# Patient Record
Sex: FEMALE | URBAN - METROPOLITAN AREA
[De-identification: names, ages, dates, MRNs, and addresses within clinical notes are randomized per-mention and may not be internally consistent; named-entity substitution may affect disease eponyms.]

---

## 2017-08-08 ENCOUNTER — IMPORTED ENCOUNTER (OUTPATIENT)
Dept: URBAN - METROPOLITAN AREA CLINIC 1 | Facility: CLINIC | Age: 77
End: 2017-08-08

## 2017-08-08 PROBLEM — Z79.4: Noted: 2017-08-08

## 2017-08-08 PROBLEM — H47.013: Noted: 2017-08-08

## 2017-08-08 PROBLEM — H26.492: Noted: 2017-08-08

## 2017-08-08 PROBLEM — Z96.1: Noted: 2017-08-08

## 2017-08-08 PROBLEM — E11.3393: Noted: 2017-08-08

## 2017-08-08 PROCEDURE — 92015 DETERMINE REFRACTIVE STATE: CPT

## 2017-08-08 PROCEDURE — 92004 COMPRE OPH EXAM NEW PT 1/>: CPT

## 2017-08-08 NOTE — PATIENT DISCUSSION
1.  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 Stressed the importance of strong glucose control. Advised of importance of at least yearly dilated examinations but to contact us immediately for any problems or concerns. 2. Type II Insulin dependent diabetes mellitus3. Pseudophakia OU w/ H/o YAG Cap OD and PCO OS-Visually Significant and yag cap recommended. Risks and benefits discussed with pt and pt desires to schedule yag cap. 4. Optic atrophy OU - likely secondary to previous uncontrolled HTN. Letter sent to Dr. Bob Mooney for an appointment in YAG Cap OS within 4 weeks with Dr. Dang Aguilar.

## 2017-09-22 ENCOUNTER — IMPORTED ENCOUNTER (OUTPATIENT)
Dept: URBAN - METROPOLITAN AREA CLINIC 1 | Facility: CLINIC | Age: 77
End: 2017-09-22

## 2017-09-22 PROBLEM — H26.492: Noted: 2017-09-22

## 2017-09-22 PROCEDURE — 66821 AFTER CATARACT LASER SURGERY: CPT

## 2017-09-22 NOTE — PATIENT DISCUSSION
YAG CAP OS: (Consent signed and scanned into attachments) 1 gtt Prolensa applied. The purpose and nature of the procedure possible alternative methods of treatment the risks involved and the possibility of complications were discussed with patient. The Patient wishes to proceed and the consent was signed. The laser was then performed under topical anesthesia with no complications. Post op instructions were given to patient as well as a follow-up appointment. Patient was advised to call our office if any questions or concerns.  RTC 1-4 weeks YAG PO.

## 2017-10-03 ENCOUNTER — IMPORTED ENCOUNTER (OUTPATIENT)
Dept: URBAN - METROPOLITAN AREA CLINIC 1 | Facility: CLINIC | Age: 77
End: 2017-10-03

## 2017-10-03 PROBLEM — Z09: Noted: 2017-10-03

## 2017-10-03 PROCEDURE — 99024 POSTOP FOLLOW-UP VISIT: CPT

## 2017-10-03 NOTE — PATIENT DISCUSSION
1. PO YAG Cap OS: good result. MRX given. 2.  Return for an appointment for a dfe in Feb/March with Dr. Toyin Kruger.

## 2022-04-02 ASSESSMENT — VISUAL ACUITY
OD_CC: J3
OD_CC: 20/50
OS_CC: J7
OS_CC: 20/150
OD_CC: 20/30-1
OS_CC: 20/150

## 2022-04-02 ASSESSMENT — TONOMETRY
OD_IOP_MMHG: 18
OS_IOP_MMHG: 17
OS_IOP_MMHG: 17
OD_IOP_MMHG: 17